# Patient Record
Sex: MALE | Race: WHITE
[De-identification: names, ages, dates, MRNs, and addresses within clinical notes are randomized per-mention and may not be internally consistent; named-entity substitution may affect disease eponyms.]

---

## 2018-05-07 ENCOUNTER — HOSPITAL ENCOUNTER (OUTPATIENT)
Dept: HOSPITAL 61 - LAB | Age: 68
Discharge: HOME | End: 2018-05-07
Attending: INTERNAL MEDICINE
Payer: COMMERCIAL

## 2018-05-07 DIAGNOSIS — R93.8: Primary | ICD-10-CM

## 2018-05-07 DIAGNOSIS — Z87.19: ICD-10-CM

## 2018-05-07 PROCEDURE — 83497 ASSAY OF 5-HIAA: CPT

## 2018-05-12 LAB
5OH-INDOLEACETATE 24H UR-MRATE: 5 MG/24 HR (ref 0–14.9)
Lab: 3 MG/L

## 2020-03-12 ENCOUNTER — HOSPITAL ENCOUNTER (OUTPATIENT)
Dept: HOSPITAL 35 - OR | Age: 70
Discharge: HOME | End: 2020-03-12
Attending: ORTHOPAEDIC SURGERY
Payer: COMMERCIAL

## 2020-03-12 VITALS — SYSTOLIC BLOOD PRESSURE: 160 MMHG | DIASTOLIC BLOOD PRESSURE: 81 MMHG

## 2020-03-12 VITALS — HEIGHT: 71 IN | WEIGHT: 302 LBS | BODY MASS INDEX: 42.28 KG/M2

## 2020-03-12 DIAGNOSIS — Z88.8: ICD-10-CM

## 2020-03-12 DIAGNOSIS — M67.242: ICD-10-CM

## 2020-03-12 DIAGNOSIS — Z79.899: ICD-10-CM

## 2020-03-12 DIAGNOSIS — Z98.890: ICD-10-CM

## 2020-03-12 DIAGNOSIS — M79.89: ICD-10-CM

## 2020-03-12 DIAGNOSIS — M67.442: ICD-10-CM

## 2020-03-12 DIAGNOSIS — Z87.891: ICD-10-CM

## 2020-03-12 DIAGNOSIS — I10: ICD-10-CM

## 2020-03-12 DIAGNOSIS — M71.342: ICD-10-CM

## 2020-03-12 DIAGNOSIS — M19.042: Primary | ICD-10-CM

## 2020-03-12 PROCEDURE — 57006: CPT

## 2020-03-12 PROCEDURE — 56526: CPT

## 2020-03-12 PROCEDURE — 57091: CPT

## 2020-03-12 PROCEDURE — 50101: CPT

## 2020-03-12 PROCEDURE — 50010 RENAL EXPLORATION: CPT

## 2020-03-12 PROCEDURE — 50386 REMOVE STENT VIA TRANSURETH: CPT

## 2020-03-12 NOTE — O
[facility]
Michelle De Oliveira [facility] [place]   [address]                     OPERATIVE REPORT              
_______________________________________________________________________________
 
Name:       TAVARES GAUTAM               Room #:                     DEP [facility].#:      [id number]                       Account #:      [id number]  
Admission:  03/12/20    Attend Phys:    Cindy Mondragon, 
Discharge:  03/12/20    Date of Birth:  [birth year]  
                                                          Report #: [id number]
                                                                    [id number]   
_______________________________________________________________________________
THIS REPORT FOR:  
 
cc:  LILLIG,SHAWN PHILLIP          
     Physician not on staff                                               
     Cindy Mondragon MD                                      ~
CC: Physician staff
    SHAWN LILLIG Valerie Deardorff
 
DATE OF SERVICE:  03/12/2020
 
 
PREOPERATIVE DIAGNOSES:
1.  Left thumb dorsal mass, mucous cyst.
2.  Left thumb interphalangeal joint arthrosis.
 
POSTOPERATIVE DIAGNOSES: 
1.  Left thumb dorsal mass, mucous cyst.
2.  Left thumb interphalangeal joint arthrosis.
 
PROCEDURE PERFORMED:  Left thumb dorsal mass excision, joint debridement.
 
SPECIMEN:  Sent to pathology.
 
SURGEON:  Cindy Mondragon MD
 
ANESTHESIA:  Local anesthesia.
 
ESTIMATED BLOOD LOSS:  Minimal.
 
FINGER TOURNIQUET TIME:  Please see intraoperative record, approximately 12
minutes.
 
COMPLICATIONS:  None.
 
CONDITION:  Stable.
 
DISPOSITION:  Recovery room.
 
INDICATIONS:  The patient is a 70-year-old male with the above-mentioned
diagnosis.  He elects for operative treatment.  The risks, benefits,
alternatives and complications were discussed including but not limited to
recurrence, infection, damage to vessels or nerves, wound healing problems, nail
deformity.  Informed consent was obtained.  The correct extremity was identified
and labeled by myself after verbal confirmation of the patient as well as visual
confirmation and signed informed consent.
 
 
 
[address]   [address]                     OPERATIVE REPORT              
_______________________________________________________________________________
 
Name:       TAVARES GAUTAM               Room #:                     DEP [facility]..#:      [id number]                       Account #:      [id number]  
Admission:  03/12/20    Attend Phys:    Cindy Mondragon, 
Discharge:  03/12/20    Date of Birth:  [birth year]  
                                                          Report #: [id number]
                                                                    [id number]   
_______________________________________________________________________________
 
In the preoperative holding area, under sterile conditions, a total of
approximately 8 mL of 0.25% Marcaine was injected in divided dose, both volarly
and dorsally at the base of the thumb.  He tolerated this well.  He did require
a few milliliters of supplemental lidocaine in the operating room during the
surgical procedure and a finger tourniquet was used.
 
DESCRIPTION OF PROCEDURE:  The patient was brought back to the OR and placed on
the operating table in the supine position.  He received preoperative
antibiotics.  Left extremity was sterilely prepped and draped in the usual
fashion.  Final timeout was taken to verify correct patient, operative
procedure, and operative site, all concurred.  The finger tourniquet was
applied.  The entire procedure was done with aid of 3.5 times loupe
magnification.  Next, a T-type incision was made over the thumb IP joint.  Full
thickness flaps were created.  A small about approximately 5 mm dorsal mass
consistent with ganglion cyst was excised.  The joint was entered and debrided. 
Fluoroscopy was brought in, which showed absence of significant bone spurs.  The
wound was thoroughly irrigated.  The skin was closed with 4-0 nylon suture.  The
finger tourniquet was removed.  The thumb was pink and had brisk capillary
refill.  The wound was dressed with Adaptic and sterile gauze.  He was placed in
a bulky dressing and a volar thumb splint.  All fingers were pink with brisk
capillary refill at the conclusion of case after removal of the tourniquet.  All
sponge and needle counts were correct.  The patient was transferred to
postoperative recovery room in stable condition.
 
 
 
 
 
 
 
 
 
 
 
 
 
 
 
 
 
 
 
 
                         
   By:                               
                   
D: 03/16/20 1112                           _____________________________________
T: 03/16/20 1136                           Cindy Mondragon MD        /nt

## 2020-03-16 NOTE — PATH
[facility]
[address]
[place]   [address]                     PATHOLOGY RPT PROCEDURE       
_______________________________________________________________________________
 
Name:       FABIAN GAUTAM               Room #:                     [place] [facility] [place] 
M.R.#:      [id number]     Account #:      [id number]  
Admission:  03/12/20    Date of Birth:  [birth year]  
Discharge:  03/12/20                                    Report #:    [id number]
                                                        Path Case #: [id number]
_______________________________________________________________________________
 
[facility] Accession Number: [id number]
.                                                                01
Material submitted:                                        .
thumb - LEFT THUMB DORSAL MASS. Modifiers: left, dorsal
.                                                                01
Clinical history:                                          .
Localized swelling, mass and lump, left upper limb
.                                                                02
**********************************************************************
Diagnosis:
Soft tissue, left thumb dorsal mass, excision:
- Fragments comprised of dense fibrous tissue associated with mild chronic
inflammation and myxoid changes, compatible with a ganglion cyst.
- Additional fragments of synovium showing reactive synovial hyperplasia.
(IUV/db; 3/16/2020)
LBQ  03/16/2020  1430 Local
**********************************************************************
.                                                                02
Electronically signed:                                     .
Suyapa Lobo MD, Pathologist
NPI- [id number]
.                                                                01
Gross description:                                         .
The specimen is received in formalin, labeled "Fabian Gautam, left thumb
dorsal mass".  Received are several segments of pale tan cystic tissue
measuring 0.8 x 0.7 x 0.3 cm in aggregate dimensions.  The specimen is
submitted entirely in cassette A1.
(CAA; 3/13/2020)
QAC/QAC  03/13/2020  1533 Local
.                                                                02
Pathologist provided ICD-10:
M79.9
.                                                                02
CPT                                                        .
209994
Specimen Comment: A courtesy copy of this report has been sent to [phone]
[phone]
Specimen Comment: [id number]
Specimen Comment: Report sent to  / DR LILLIG
***Performed at:  01
   [address], [place]  [id number]
   MD Gabriele Escoto MD Phone:  [phone]
***Performed at:  02
   [address]  [id number]
   MD Suyapa Lobo MD Phone:  [phone]